# Patient Record
(demographics unavailable — no encounter records)

---

## 2025-07-29 NOTE — PLAN
[TextEntry] : - Incidental finding of a 2 mm calcification along the R side of the epiglottis on recent CT scan. No associated symptoms. - Images reviewed with patient today. Scope only showed a small cyst on the R side of the epiglottis. This could be the origin of the calcification. No suspicious lesions. - Discussed findings with the patient and will continue with surveillance at this time. - Return in 3 months.

## 2025-07-29 NOTE — HISTORY OF PRESENT ILLNESS
[de-identified] : 57 yro pt referred by Dr. Carlos Alberto Ventura for eval of throat.   CT scan done for thyroid nodule and neck pain showed incidental finding of calcification along the right side of the epiglottis.  Pt reports R side throat pain for the past 6 yrs. Also with R shoulder blade going to the cervical spine per pt.  No dysphagia, hemoptysis, voice changes, dyspnea or dysphonia. Tolerating regular diet without issues.   6/27/25 US NECK was unremarkable.  6/27/25 US thyroid showed multiple nodules and recommend FNA for 1.4cm right upper pole nodule.  7/17/25 s/p FNA of R upper pole nodule (1.4cm) was benign (bethesda cat II)  7/19/25 CT neck w/ contrast ([facility]) : No lymphadenopathy. Thyroid nodules are better than prior US  addendum: no epiglottic mass is identified. There is a 2mm nonspecific calcification along the right side of the epiglottis

## 2025-07-29 NOTE — REASON FOR VISIT
[Initial Evaluation] : an initial evaluation for [[employer] [facility] ] : provided by [employer]   [FreeTextEntry2] : throat eval- epiglottis  [Interpreters_IDNumber] : [id number] [Interpreters_FullName] : Jonathon  [FreeTextEntry3] : mandarin

## 2025-07-29 NOTE — CONSULT LETTER
[Dear  ___] : Dear  [unfilled], [Consult Letter:] : I had the pleasure of evaluating your patient, [unfilled]. [Please see my note below.] : Please see my note below. [Consult Closing:] : Thank you very much for allowing me to participate in the care of this patient.  If you have any questions, please do not hesitate to contact me. [Sincerely,] : Sincerely, [FreeTextEntry2] : Dr Carlos Alberto Ventura [FreeTextEntry3] :  Donnie Stewart MD, FACS  Otolaryngology-Head and Neck Surgery [facility] [facility] [employer] [facility]